# Patient Record
Sex: FEMALE | Race: WHITE | Employment: STUDENT | URBAN - METROPOLITAN AREA
[De-identification: names, ages, dates, MRNs, and addresses within clinical notes are randomized per-mention and may not be internally consistent; named-entity substitution may affect disease eponyms.]

---

## 2017-05-25 ENCOUNTER — TELEPHONE (OUTPATIENT)
Dept: FAMILY MEDICINE CLINIC | Facility: CLINIC | Age: 22
End: 2017-05-25

## 2017-05-25 RX ORDER — LEVONORGESTREL AND ETHINYL ESTRADIOL 0.1-0.02MG
1 KIT ORAL
Qty: 3 PACKAGE | Refills: 1 | Status: SHIPPED | OUTPATIENT
Start: 2017-05-25 | End: 2017-11-19

## 2017-05-25 NOTE — TELEPHONE ENCOUNTER
Incoming fax received from [facility] for refill on OCP. LOV and Pap 11/21/16. Refill approved per protocol.

## 2017-06-12 ENCOUNTER — OFFICE VISIT (OUTPATIENT)
Dept: FAMILY MEDICINE CLINIC | Facility: CLINIC | Age: 22
End: 2017-06-12

## 2017-06-12 DIAGNOSIS — Z11.1 SCREENING FOR TUBERCULOSIS: Primary | ICD-10-CM

## 2017-06-12 PROCEDURE — 86580 TB INTRADERMAL TEST: CPT | Performed by: NURSE PRACTITIONER

## 2017-06-15 ENCOUNTER — OFFICE VISIT (OUTPATIENT)
Dept: FAMILY MEDICINE CLINIC | Facility: CLINIC | Age: 22
End: 2017-06-15

## 2017-06-15 DIAGNOSIS — Z11.1 SCREENING FOR TUBERCULOSIS: Primary | ICD-10-CM

## 2017-09-11 RX ORDER — LEVONORGESTREL AND ETHINYL ESTRADIOL 0.1-0.02MG
1 KIT ORAL
Qty: 3 PACKAGE | Refills: 1
Start: 2017-09-11

## 2017-09-12 NOTE — TELEPHONE ENCOUNTER
From: Sonja Lord  Sent: 9/11/2017 5:48 PM CDT  Subject: Medication Renewal Request    Vandana Little.  Roger Everardo would like a refill of the following medications:  Levonorgestrel-Ethinyl Estrad (Jennifer Ulloa) 0.1-20 MG-MCG Oral Tab Karan Harmon MD]    Preferred pharmacy: [address] [place] [address] [phone], [phone]    Comment:

## 2017-09-12 NOTE — TELEPHONE ENCOUNTER
This was refilled on 5/25/17 for 90 days with one refill should last until 11/25/17 patient due for physical then

## 2017-11-22 RX ORDER — LEVONORGESTREL AND ETHINYL ESTRADIOL 0.1-0.02MG
KIT ORAL
Qty: 84 TABLET | Refills: 0 | Status: SHIPPED | OUTPATIENT
Start: 2017-11-22 | End: 2018-02-13

## 2017-11-22 NOTE — TELEPHONE ENCOUNTER
Please call Pt and advise she is due for annual Physical. No Pap needed. Please assist with scheduling. Routed to front staff.

## 2017-12-04 NOTE — TELEPHONE ENCOUNTER
LMOM for pt to call back and schedule her Physical w/Dr Kam Roy (med called to pharmacy)  3 tries letter sent to pt on MY CHART as well

## 2018-02-15 RX ORDER — LEVONORGESTREL AND ETHINYL ESTRADIOL 0.1-0.02MG
KIT ORAL
Qty: 84 TABLET | Refills: 0 | Status: SHIPPED | OUTPATIENT
Start: 2018-02-15 | End: 2018-03-27

## 2018-02-15 NOTE — TELEPHONE ENCOUNTER
Please call Pt  And advise that she is due for Annual Physical, no Pap. Please assist with scheduling. Routed to front staff.

## 2018-03-01 NOTE — TELEPHONE ENCOUNTER
LMOM for pt to call back and schedule her WWE NO pap w/Dr Hermelinda Ashley  (med called to pharmacy)

## 2018-03-07 ENCOUNTER — TELEPHONE (OUTPATIENT)
Dept: FAMILY MEDICINE CLINIC | Facility: CLINIC | Age: 23
End: 2018-03-07

## 2018-03-27 ENCOUNTER — OFFICE VISIT (OUTPATIENT)
Dept: FAMILY MEDICINE CLINIC | Facility: CLINIC | Age: 23
End: 2018-03-27

## 2018-03-27 VITALS
HEART RATE: 76 BPM | HEIGHT: 65 IN | WEIGHT: 156 LBS | DIASTOLIC BLOOD PRESSURE: 76 MMHG | RESPIRATION RATE: 16 BRPM | TEMPERATURE: 98 F | SYSTOLIC BLOOD PRESSURE: 106 MMHG | BODY MASS INDEX: 25.99 KG/M2

## 2018-03-27 DIAGNOSIS — Z00.00 ROUTINE GENERAL MEDICAL EXAMINATION AT A HEALTH CARE FACILITY: Primary | ICD-10-CM

## 2018-03-27 DIAGNOSIS — Z30.41 ENCOUNTER FOR SURVEILLANCE OF CONTRACEPTIVE PILLS: ICD-10-CM

## 2018-03-27 PROCEDURE — 99395 PREV VISIT EST AGE 18-39: CPT | Performed by: FAMILY MEDICINE

## 2018-03-27 RX ORDER — LEVONORGESTREL AND ETHINYL ESTRADIOL 0.1-0.02MG
1 KIT ORAL
Qty: 84 TABLET | Refills: 3 | Status: SHIPPED | OUTPATIENT
Start: 2018-03-27 | End: 2018-05-11

## 2018-03-27 NOTE — PROGRESS NOTES
HPI:   Trini Griffin is a 25year old female who presents for a complete physical exam.  Last pap:  11/2016  Last mammogram:  n/a   Self exams:  no  Menses:  regular  Contraception:  OCPs  Previous colonoscopy:  n/a  Previous DEXA:  n/a.   Current or prev 156 lb   LMP 03/21/2018   BMI 25.96 kg/m²   Body mass index is 25.96 kg/m².    GENERAL: well developed, well nourished,in no apparent distress  SKIN: no rashes,no suspicious lesions  HEENT: atraumatic, normocephalic,throat are clear; dentition good  EYES:PE

## 2018-05-09 ENCOUNTER — TELEPHONE (OUTPATIENT)
Dept: FAMILY MEDICINE CLINIC | Facility: CLINIC | Age: 23
End: 2018-05-09

## 2018-05-09 NOTE — TELEPHONE ENCOUNTER
Filled BCP to [facility] in March for 90 + 3 now have fax from [facility] to reorder to them called patient to make sure this is what she wants if so can sent to [facility]

## 2018-05-11 RX ORDER — LEVONORGESTREL AND ETHINYL ESTRADIOL 0.1-0.02MG
1 KIT ORAL
Qty: 84 TABLET | Refills: 2 | Status: SHIPPED | OUTPATIENT
Start: 2018-05-11 | End: 2019-01-17

## 2019-01-18 RX ORDER — LEVONORGESTREL AND ETHINYL ESTRADIOL 0.1-0.02MG
KIT ORAL
Qty: 84 TABLET | Refills: 0 | Status: SHIPPED | OUTPATIENT
Start: 2019-01-18 | End: 2019-01-28

## 2019-01-18 NOTE — TELEPHONE ENCOUNTER
Requested Prescriptions     Pending Prescriptions Disp Refills   • LEVONORGESTREL-ETHINYL ESTRAD 0.1-20 MG-MCG Oral Tab [Pharmacy Med Name: L-NORGES/STEVIE EST TABS 28'S 0.1/20] 84 tablet 2     Sig: TAKE 1 TABLET DAILY       LOV 3/27/2018     Appointment sche

## 2019-01-31 ENCOUNTER — TELEPHONE (OUTPATIENT)
Dept: FAMILY MEDICINE CLINIC | Facility: CLINIC | Age: 24
End: 2019-01-31

## 2019-01-31 NOTE — TELEPHONE ENCOUNTER
[facility] is calling to get clarification on which generic birth control they are refilling.

## 2019-11-13 ENCOUNTER — TELEPHONE (OUTPATIENT)
Dept: FAMILY MEDICINE CLINIC | Facility: CLINIC | Age: 24
End: 2019-11-13

## 2019-11-13 DIAGNOSIS — Z13.228 SCREENING FOR METABOLIC DISORDER: ICD-10-CM

## 2019-11-13 DIAGNOSIS — Z13.220 SCREENING FOR LIPID DISORDERS: ICD-10-CM

## 2019-11-13 DIAGNOSIS — Z13.0 SCREENING FOR BLOOD DISEASE: Primary | ICD-10-CM

## 2019-11-13 DIAGNOSIS — Z13.29 SCREENING FOR THYROID DISORDER: ICD-10-CM

## 2019-11-13 NOTE — TELEPHONE ENCOUNTER
Please enter lab orders for the patient's upcoming physical appointment. Physical scheduled:    Your appointments     Date & Time Appointment Department [facility])    Dec 23, 2019 10:00 AM CST Physical - Established with Clair Claude, MD [facility]

## 2019-12-23 ENCOUNTER — OFFICE VISIT (OUTPATIENT)
Dept: FAMILY MEDICINE CLINIC | Facility: CLINIC | Age: 24
End: 2019-12-23
Payer: COMMERCIAL

## 2019-12-23 VITALS
TEMPERATURE: 98 F | DIASTOLIC BLOOD PRESSURE: 60 MMHG | RESPIRATION RATE: 16 BRPM | BODY MASS INDEX: 26.33 KG/M2 | SYSTOLIC BLOOD PRESSURE: 100 MMHG | HEART RATE: 72 BPM | HEIGHT: 65 IN | WEIGHT: 158 LBS

## 2019-12-23 DIAGNOSIS — Z11.51 SCREENING FOR HUMAN PAPILLOMAVIRUS (HPV): ICD-10-CM

## 2019-12-23 DIAGNOSIS — Z00.00 ROUTINE GENERAL MEDICAL EXAMINATION AT A HEALTH CARE FACILITY: Primary | ICD-10-CM

## 2019-12-23 PROCEDURE — 88175 CYTOPATH C/V AUTO FLUID REDO: CPT | Performed by: FAMILY MEDICINE

## 2019-12-23 PROCEDURE — 99395 PREV VISIT EST AGE 18-39: CPT | Performed by: FAMILY MEDICINE

## 2019-12-23 NOTE — PROGRESS NOTES
HPI:   Cristhian Cadena is a 25year old female who presents for a complete physical exam.  Last pap:  Done today 12/2019  Last mammogram:  n/a   Self exams:  no  Menses:  regular  Contraception: none, previously on ocps  Previous colonoscopy:  n/a  Previou well developed, well nourished,in no apparent distress  SKIN: no rashes,no suspicious lesions  HEENT: atraumatic, normocephalic,throat are clear; dentition good  EYES:PERRLA, EOMI,conjunctiva are clear  NECK: supple,no adenopathy  BREAST: no dominant or seo

## 2020-06-25 NOTE — MR AVS SNAPSHOT
[address]  [address] [facility] [address] [phone]               Thank you for choosing us for your health care visit with TOSHIA Pollock.   We are glad to serve you and happy to provide you with this summary Summaries. If you've been to the Emergency Department or your doctor's office, you can view your past visit information in [employer] by going to Visits < Visit Summaries. [employer] questions? Call [phone] for help.   [employer] is NOT to be used for urge negative...

## 2021-01-02 ENCOUNTER — TELEPHONE (OUTPATIENT)
Dept: FAMILY MEDICINE CLINIC | Facility: CLINIC | Age: 26
End: 2021-01-02

## 2021-01-02 DIAGNOSIS — Z00.00 ROUTINE GENERAL MEDICAL EXAMINATION AT A HEALTH CARE FACILITY: Primary | ICD-10-CM

## 2021-01-02 NOTE — TELEPHONE ENCOUNTER
Please enter lab orders for the patient's upcoming physical appointment. Physical scheduled:    Your appointments     Date & Time Appointment Department [facility])    Feb 08, 2021 12:00 PM CST Physical - Established with Sterling Martinez MD [facility]

## 2021-02-02 LAB
ALBUMIN/GLOBULIN RATIO: 1.6 (CALC) (ref 1–2.5)
ALBUMIN: 4.5 G/DL (ref 3.6–5.1)
ALKALINE PHOSPHATASE: 64 U/L (ref 31–125)
ALT: 11 U/L (ref 6–29)
AST: 18 U/L (ref 10–30)
BILIRUBIN, TOTAL: 0.5 MG/DL (ref 0.2–1.2)
BUN: 11 MG/DL (ref 7–25)
CALCIUM: 10.1 MG/DL (ref 8.6–10.2)
CARBON DIOXIDE: 25 MMOL/L (ref 20–32)
CHLORIDE: 102 MMOL/L (ref 98–110)
CHOL/HDLC RATIO: 3 (CALC)
CHOLESTEROL, TOTAL: 191 MG/DL
CREATININE: 0.77 MG/DL (ref 0.5–1.1)
EGFR IF AFRICN AM: 124 ML/MIN/1.73M2
EGFR IF NONAFRICN AM: 107 ML/MIN/1.73M2
GLOBULIN: 2.9 G/DL (CALC) (ref 1.9–3.7)
GLUCOSE: 87 MG/DL (ref 65–99)
HDL CHOLESTEROL: 63 MG/DL
HEMATOCRIT: 43.2 % (ref 35–45)
HEMOGLOBIN: 14.7 G/DL (ref 11.7–15.5)
LDL-CHOLESTEROL: 109 MG/DL (CALC)
MCH: 31.2 PG (ref 27–33)
MCHC: 34 G/DL (ref 32–36)
MCV: 91.7 FL (ref 80–100)
MPV: 11.9 FL (ref 7.5–12.5)
NON-HDL CHOLESTEROL: 128 MG/DL (CALC)
PLATELET COUNT: 245 THOUSAND/UL (ref 140–400)
POTASSIUM: 3.9 MMOL/L (ref 3.5–5.3)
PROTEIN, TOTAL: 7.4 G/DL (ref 6.1–8.1)
RDW: 12.1 % (ref 11–15)
RED BLOOD CELL COUNT: 4.71 MILLION/UL (ref 3.8–5.1)
SODIUM: 138 MMOL/L (ref 135–146)
TRIGLYCERIDES: 96 MG/DL
TSH W/REFLEX TO FT4: 1.79 MIU/L
WHITE BLOOD CELL COUNT: 5 THOUSAND/UL (ref 3.8–10.8)

## 2021-02-08 ENCOUNTER — OFFICE VISIT (OUTPATIENT)
Dept: FAMILY MEDICINE CLINIC | Facility: CLINIC | Age: 26
End: 2021-02-08
Payer: COMMERCIAL

## 2021-02-08 VITALS
HEIGHT: 65 IN | HEART RATE: 69 BPM | TEMPERATURE: 98 F | WEIGHT: 154.19 LBS | DIASTOLIC BLOOD PRESSURE: 62 MMHG | RESPIRATION RATE: 15 BRPM | BODY MASS INDEX: 25.69 KG/M2 | SYSTOLIC BLOOD PRESSURE: 106 MMHG

## 2021-02-08 DIAGNOSIS — Z00.00 ROUTINE GENERAL MEDICAL EXAMINATION AT A HEALTH CARE FACILITY: Primary | ICD-10-CM

## 2021-02-08 PROCEDURE — 99395 PREV VISIT EST AGE 18-39: CPT | Performed by: FAMILY MEDICINE

## 2021-02-08 PROCEDURE — 3078F DIAST BP <80 MM HG: CPT | Performed by: FAMILY MEDICINE

## 2021-02-08 PROCEDURE — 3008F BODY MASS INDEX DOCD: CPT | Performed by: FAMILY MEDICINE

## 2021-02-08 PROCEDURE — 3074F SYST BP LT 130 MM HG: CPT | Performed by: FAMILY MEDICINE

## 2021-02-08 NOTE — PROGRESS NOTES
HPI:   Megan Merino is a 22year old female who presents for a complete physical exam.  Last pap: 12/2019  Last mammogram:  n/a   Menses:  regular  Contraception: none, previously on ocps  Previous colonoscopy:  n/a  Previous DEXA:  n/a.   Current or pre (Temporal)   Resp 15   Ht 5' 5\" (1.651 m)   Wt 154 lb 3.2 oz (69.9 kg)   LMP 01/25/2021   BMI 25.66 kg/m²   Body mass index is 25.66 kg/m².    GENERAL: well developed, well nourished,in no apparent distress  SKIN: no rashes,no suspicious lesions  HEENT: at

## 2021-03-15 DIAGNOSIS — Z23 NEED FOR VACCINATION: ICD-10-CM

## 2021-03-21 ENCOUNTER — OFFICE VISIT (OUTPATIENT)
Dept: FAMILY MEDICINE CLINIC | Facility: CLINIC | Age: 26
End: 2021-03-21
Payer: COMMERCIAL

## 2021-03-21 VITALS
BODY MASS INDEX: 26 KG/M2 | SYSTOLIC BLOOD PRESSURE: 128 MMHG | HEART RATE: 88 BPM | OXYGEN SATURATION: 100 % | TEMPERATURE: 98 F | WEIGHT: 154 LBS | DIASTOLIC BLOOD PRESSURE: 82 MMHG | RESPIRATION RATE: 18 BRPM

## 2021-03-21 DIAGNOSIS — R30.0 DYSURIA: ICD-10-CM

## 2021-03-21 DIAGNOSIS — R39.9 UTI SYMPTOMS: Primary | ICD-10-CM

## 2021-03-21 LAB
APPEARANCE: CLEAR
BILIRUBIN: NEGATIVE
GLUCOSE (URINE DIPSTICK): NEGATIVE MG/DL
KETONES (URINE DIPSTICK): NEGATIVE MG/DL
LEUKOCYTES: NEGATIVE
MULTISTIX LOT#: 9044 NUMERIC
NITRITE, URINE: 0.2
OCCULT BLOOD: NEGATIVE
PH, URINE: 8.5 (ref 4.5–8)
PROTEIN (URINE DIPSTICK): 30 MG/DL
SPECIFIC GRAVITY: 1.02 (ref 1–1.03)
UROBILINOGEN,SEMI-QN: 30 MG/DL (ref 0–1.9)

## 2021-03-21 PROCEDURE — 81003 URINALYSIS AUTO W/O SCOPE: CPT | Performed by: NURSE PRACTITIONER

## 2021-03-21 PROCEDURE — 99213 OFFICE O/P EST LOW 20 MIN: CPT | Performed by: NURSE PRACTITIONER

## 2021-03-21 PROCEDURE — 3079F DIAST BP 80-89 MM HG: CPT | Performed by: NURSE PRACTITIONER

## 2021-03-21 PROCEDURE — 3074F SYST BP LT 130 MM HG: CPT | Performed by: NURSE PRACTITIONER

## 2021-03-21 NOTE — PROGRESS NOTES
CHIEF COMPLAINT:   Patient presents with:  UTI: Entered by patient started this morning      HPI:   Angélica Doran is a 22year old female who presents with symptoms of UTI. Complaining of urinary frequency, urgency, and dysuria for last few hours.   Raad Rios Glucose Urine negative mg/dL    Bilirubin negative Negative    Ketones, UA negative Negative mg/dL    Spec Gravity 1.020 1.005 - 1.030    Blood Urine negative Negative    PH Urine 8.5 (A) 4.5 - 8.0    Protein Urine 30 Negative/Trace mg/dL    Urobilinogen U certain tests, healthcare providers may see pinpoints of bleeding (glomerulations) on your bladder wall. In rare cases, healthcare providers may also find an ulcer (Hunner ulcer). What are the symptoms of interstitial cystitis?    Symptoms in women may ge may help you control your bladder muscles and reduce symptoms. · Electrical stimulation. Electrical signals may help block nerve sensations to and from the bladder. This may improve blood flow and strengthen pelvic muscles. · Surgery.  For severe cases, s

## 2021-03-21 NOTE — PATIENT INSTRUCTIONS
What Is Interstitial Cystitis? Cross section of bladder showing normal lining. Interstitial cystitis is a painful condition of the bladder. It causes the bladder wall to be tender and easily irritated. This leads to uncomfortable symptoms.  Intersti help ease discomfort. · Antispasmodic medicines. These may help relax the bladder muscles. This may decrease the need to urinate. · Nonsteroidal anti-inflammatory drugs (NSAIDs). These may help reduce inflammation and ease pain. · Antihistamines.  These

## 2022-12-10 ENCOUNTER — TELEPHONE (OUTPATIENT)
Dept: FAMILY MEDICINE CLINIC | Facility: CLINIC | Age: 27
End: 2022-12-10

## 2022-12-10 DIAGNOSIS — Z00.00 ROUTINE GENERAL MEDICAL EXAMINATION AT A HEALTH CARE FACILITY: Primary | ICD-10-CM

## 2022-12-10 NOTE — TELEPHONE ENCOUNTER
Please enter lab orders for the patient's upcoming physical appointment. Physical scheduled: Your appointments     Date & Time Appointment Department [facility])    Apr 14, 2023  7:45 AM CDT Adult Physical with Jovanna Yancey MD [facility]  ([address])    PLEASE NOTE - Most insurances allow a Complete Physical once every 366 days. Please schedule accordingly. Please arrive 15 minutes prior to your scheduled appointment. Please also bring your Insurance card, Photo ID, and your medication bottles or a list of your current medication. If you no longer require this appointment, please contact your physician office to cancel. Fernando Zavala [address] [facility] 911 [phone]         Preferred lab: [facility]     The patient has been notified to complete fasting labs prior to their physical appointment.

## 2023-04-14 ENCOUNTER — OFFICE VISIT (OUTPATIENT)
Dept: FAMILY MEDICINE CLINIC | Facility: CLINIC | Age: 28
End: 2023-04-14
Payer: COMMERCIAL

## 2023-04-14 VITALS
HEIGHT: 65.5 IN | WEIGHT: 172.38 LBS | TEMPERATURE: 99 F | DIASTOLIC BLOOD PRESSURE: 74 MMHG | SYSTOLIC BLOOD PRESSURE: 120 MMHG | RESPIRATION RATE: 16 BRPM | BODY MASS INDEX: 28.37 KG/M2

## 2023-04-14 DIAGNOSIS — Z00.00 ROUTINE GENERAL MEDICAL EXAMINATION AT A HEALTH CARE FACILITY: Primary | ICD-10-CM

## 2023-04-14 DIAGNOSIS — Z12.4 SCREENING FOR CERVICAL CANCER: ICD-10-CM

## 2023-04-14 DIAGNOSIS — Z11.51 SCREENING FOR HPV (HUMAN PAPILLOMAVIRUS): ICD-10-CM

## 2023-04-14 PROCEDURE — 3074F SYST BP LT 130 MM HG: CPT | Performed by: FAMILY MEDICINE

## 2023-04-14 PROCEDURE — 3008F BODY MASS INDEX DOCD: CPT | Performed by: FAMILY MEDICINE

## 2023-04-14 PROCEDURE — 99395 PREV VISIT EST AGE 18-39: CPT | Performed by: FAMILY MEDICINE

## 2023-04-14 PROCEDURE — 3078F DIAST BP <80 MM HG: CPT | Performed by: FAMILY MEDICINE

## 2023-12-19 NOTE — PATIENT INSTRUCTIONS
You had a Tb test placed in your forearm today. Please return to clinic on 6/14/17 between 6:45pm-7:30pm or on 6/15/17 between 8am-6:45 pm to have the test read. Home

## (undated) NOTE — MR AVS SNAPSHOT
[address]  [address] [facility] [address] [phone]               Thank you for choosing us for your health care visit with TOSHIA Diaz.   We are glad to serve you and happy to provide you with this summary of yo [email or web address]

## (undated) NOTE — MR AVS SNAPSHOT
After Visit Summary   12/23/2019    Girish Metzger    MRN: [id number]           Visit Information     Date & Time  12/23/2019 10:00 AM Provider  Forrest Callaway MD [place] [address], [address] [address], [place]  Dept.  Phone  [phone] HOW TO GET STARTED: HOW TO STAY MOTIVATED:   Start activities slowly and build up over time Do what you like   Get your heart pumping – brisk walking, biking, swimming Even 10 minute increments are effective and add up over the week   2 ½ hours per week – Treatment for mild illness or injury that does not require immediate attention.      Average cost  $70*      [facility]  Monday – Friday  8:00 am – 8:00 pm   Saturday – Sunday  8:00 am – 4:00 pm    WALK-IN [facility]

## (undated) NOTE — Clinical Note
Elise 15, 2017    [address]. [place] [address] [id number]      Dear Marco Begum: The following are the results of your recent tests. Please review the list of test results.   Your result is the value on the left; we have also supplied the